# Patient Record
Sex: MALE | Race: WHITE | Employment: OTHER | URBAN - METROPOLITAN AREA
[De-identification: names, ages, dates, MRNs, and addresses within clinical notes are randomized per-mention and may not be internally consistent; named-entity substitution may affect disease eponyms.]

---

## 2020-10-02 ENCOUNTER — APPOINTMENT (OUTPATIENT)
Dept: CT IMAGING | Age: 70
End: 2020-10-02
Attending: EMERGENCY MEDICINE
Payer: MEDICARE

## 2020-10-02 ENCOUNTER — APPOINTMENT (OUTPATIENT)
Dept: GENERAL RADIOLOGY | Age: 70
End: 2020-10-02
Attending: EMERGENCY MEDICINE
Payer: MEDICARE

## 2020-10-02 ENCOUNTER — HOSPITAL ENCOUNTER (OUTPATIENT)
Age: 70
Setting detail: OBSERVATION
Discharge: HOME | End: 2020-10-03
Attending: EMERGENCY MEDICINE | Admitting: INTERNAL MEDICINE
Payer: MEDICARE

## 2020-10-02 DIAGNOSIS — R55 SYNCOPE AND COLLAPSE: Primary | ICD-10-CM

## 2020-10-02 LAB
ALBUMIN SERPL-MCNC: 3.9 G/DL (ref 3.5–5)
ALBUMIN/GLOB SERPL: 1.1 {RATIO} (ref 1.1–2.2)
ALP SERPL-CCNC: 68 U/L (ref 45–117)
ALT SERPL-CCNC: 18 U/L (ref 12–78)
ANION GAP SERPL CALC-SCNC: 5 MMOL/L (ref 5–15)
APPEARANCE UR: CLEAR
AST SERPL-CCNC: 13 U/L (ref 15–37)
BACTERIA URNS QL MICRO: NEGATIVE /HPF
BASOPHILS # BLD: 0.1 K/UL (ref 0–0.1)
BASOPHILS NFR BLD: 1 % (ref 0–1)
BILIRUB SERPL-MCNC: 0.2 MG/DL (ref 0.2–1)
BILIRUB UR QL: NEGATIVE
BNP SERPL-MCNC: 141 PG/ML
BUN SERPL-MCNC: 7 MG/DL (ref 6–20)
BUN/CREAT SERPL: 7 (ref 12–20)
CALCIUM SERPL-MCNC: 8.8 MG/DL (ref 8.5–10.1)
CHLORIDE SERPL-SCNC: 107 MMOL/L (ref 97–108)
CO2 SERPL-SCNC: 28 MMOL/L (ref 21–32)
COLOR UR: NORMAL
COMMENT, HOLDF: NORMAL
CREAT SERPL-MCNC: 1.05 MG/DL (ref 0.7–1.3)
DIFFERENTIAL METHOD BLD: NORMAL
EOSINOPHIL # BLD: 0.2 K/UL (ref 0–0.4)
EOSINOPHIL NFR BLD: 2 % (ref 0–7)
EPITH CASTS URNS QL MICRO: NORMAL /LPF
ERYTHROCYTE [DISTWIDTH] IN BLOOD BY AUTOMATED COUNT: 13 % (ref 11.5–14.5)
GLOBULIN SER CALC-MCNC: 3.4 G/DL (ref 2–4)
GLUCOSE SERPL-MCNC: 73 MG/DL (ref 65–100)
GLUCOSE UR STRIP.AUTO-MCNC: NEGATIVE MG/DL
HCT VFR BLD AUTO: 45.1 % (ref 36.6–50.3)
HGB BLD-MCNC: 15.1 G/DL (ref 12.1–17)
HGB UR QL STRIP: NEGATIVE
HYALINE CASTS URNS QL MICRO: NORMAL /LPF (ref 0–5)
IMM GRANULOCYTES # BLD AUTO: 0 K/UL (ref 0–0.04)
IMM GRANULOCYTES NFR BLD AUTO: 0 % (ref 0–0.5)
KETONES UR QL STRIP.AUTO: NEGATIVE MG/DL
LEUKOCYTE ESTERASE UR QL STRIP.AUTO: NEGATIVE
LYMPHOCYTES # BLD: 3 K/UL (ref 0.8–3.5)
LYMPHOCYTES NFR BLD: 31 % (ref 12–49)
MCH RBC QN AUTO: 31.6 PG (ref 26–34)
MCHC RBC AUTO-ENTMCNC: 33.5 G/DL (ref 30–36.5)
MCV RBC AUTO: 94.4 FL (ref 80–99)
MONOCYTES # BLD: 0.6 K/UL (ref 0–1)
MONOCYTES NFR BLD: 6 % (ref 5–13)
NEUTS SEG # BLD: 5.9 K/UL (ref 1.8–8)
NEUTS SEG NFR BLD: 60 % (ref 32–75)
NITRITE UR QL STRIP.AUTO: NEGATIVE
NRBC # BLD: 0 K/UL (ref 0–0.01)
NRBC BLD-RTO: 0 PER 100 WBC
PH UR STRIP: 6 [PH] (ref 5–8)
PLATELET # BLD AUTO: 259 K/UL (ref 150–400)
PMV BLD AUTO: 8.9 FL (ref 8.9–12.9)
POTASSIUM SERPL-SCNC: 3.5 MMOL/L (ref 3.5–5.1)
PROT SERPL-MCNC: 7.3 G/DL (ref 6.4–8.2)
PROT UR STRIP-MCNC: NEGATIVE MG/DL
RBC # BLD AUTO: 4.78 M/UL (ref 4.1–5.7)
RBC #/AREA URNS HPF: NORMAL /HPF (ref 0–5)
SAMPLES BEING HELD,HOLD: NORMAL
SODIUM SERPL-SCNC: 140 MMOL/L (ref 136–145)
SP GR UR REFRACTOMETRY: 1.01 (ref 1–1.03)
TROPONIN I SERPL-MCNC: <0.05 NG/ML
UROBILINOGEN UR QL STRIP.AUTO: 0.2 EU/DL (ref 0.2–1)
WBC # BLD AUTO: 9.8 K/UL (ref 4.1–11.1)
WBC URNS QL MICRO: NORMAL /HPF (ref 0–4)

## 2020-10-02 PROCEDURE — 99218 HC RM OBSERVATION: CPT

## 2020-10-02 PROCEDURE — 36415 COLL VENOUS BLD VENIPUNCTURE: CPT

## 2020-10-02 PROCEDURE — 70450 CT HEAD/BRAIN W/O DYE: CPT

## 2020-10-02 PROCEDURE — 83880 ASSAY OF NATRIURETIC PEPTIDE: CPT

## 2020-10-02 PROCEDURE — 85025 COMPLETE CBC W/AUTO DIFF WBC: CPT

## 2020-10-02 PROCEDURE — 84484 ASSAY OF TROPONIN QUANT: CPT

## 2020-10-02 PROCEDURE — 81001 URINALYSIS AUTO W/SCOPE: CPT

## 2020-10-02 PROCEDURE — 71045 X-RAY EXAM CHEST 1 VIEW: CPT

## 2020-10-02 PROCEDURE — 99285 EMERGENCY DEPT VISIT HI MDM: CPT

## 2020-10-02 PROCEDURE — 93005 ELECTROCARDIOGRAM TRACING: CPT

## 2020-10-02 PROCEDURE — 80053 COMPREHEN METABOLIC PANEL: CPT

## 2020-10-02 PROCEDURE — 72125 CT NECK SPINE W/O DYE: CPT

## 2020-10-02 RX ORDER — SODIUM CHLORIDE 9 MG/ML
100 INJECTION, SOLUTION INTRAVENOUS CONTINUOUS
Status: DISCONTINUED | OUTPATIENT
Start: 2020-10-03 | End: 2020-10-03 | Stop reason: HOSPADM

## 2020-10-02 RX ORDER — ACETAMINOPHEN 650 MG/1
650 SUPPOSITORY RECTAL
Status: DISCONTINUED | OUTPATIENT
Start: 2020-10-02 | End: 2020-10-03 | Stop reason: HOSPADM

## 2020-10-02 RX ORDER — POLYETHYLENE GLYCOL 3350 17 G/17G
17 POWDER, FOR SOLUTION ORAL DAILY PRN
Status: DISCONTINUED | OUTPATIENT
Start: 2020-10-02 | End: 2020-10-03 | Stop reason: HOSPADM

## 2020-10-02 RX ORDER — ONDANSETRON 2 MG/ML
4 INJECTION INTRAMUSCULAR; INTRAVENOUS
Status: DISCONTINUED | OUTPATIENT
Start: 2020-10-02 | End: 2020-10-03 | Stop reason: HOSPADM

## 2020-10-02 RX ORDER — PROMETHAZINE HYDROCHLORIDE 25 MG/1
12.5 TABLET ORAL
Status: DISCONTINUED | OUTPATIENT
Start: 2020-10-02 | End: 2020-10-03 | Stop reason: HOSPADM

## 2020-10-02 RX ORDER — SODIUM CHLORIDE 0.9 % (FLUSH) 0.9 %
5-40 SYRINGE (ML) INJECTION AS NEEDED
Status: DISCONTINUED | OUTPATIENT
Start: 2020-10-02 | End: 2020-10-03 | Stop reason: HOSPADM

## 2020-10-02 RX ORDER — IBUPROFEN 200 MG
1 TABLET ORAL DAILY
Status: DISCONTINUED | OUTPATIENT
Start: 2020-10-02 | End: 2020-10-03 | Stop reason: HOSPADM

## 2020-10-02 RX ORDER — IBUPROFEN 200 MG
1 TABLET ORAL DAILY
Status: DISCONTINUED | OUTPATIENT
Start: 2020-10-03 | End: 2020-10-02

## 2020-10-02 RX ORDER — ENOXAPARIN SODIUM 100 MG/ML
40 INJECTION SUBCUTANEOUS DAILY
Status: DISCONTINUED | OUTPATIENT
Start: 2020-10-03 | End: 2020-10-03 | Stop reason: HOSPADM

## 2020-10-02 RX ORDER — ACETAMINOPHEN 325 MG/1
650 TABLET ORAL
Status: DISCONTINUED | OUTPATIENT
Start: 2020-10-02 | End: 2020-10-03 | Stop reason: HOSPADM

## 2020-10-02 RX ORDER — SODIUM CHLORIDE 0.9 % (FLUSH) 0.9 %
5-40 SYRINGE (ML) INJECTION EVERY 8 HOURS
Status: DISCONTINUED | OUTPATIENT
Start: 2020-10-03 | End: 2020-10-03 | Stop reason: HOSPADM

## 2020-10-03 ENCOUNTER — APPOINTMENT (OUTPATIENT)
Dept: NON INVASIVE DIAGNOSTICS | Age: 70
End: 2020-10-03
Attending: INTERNAL MEDICINE
Payer: MEDICARE

## 2020-10-03 VITALS
SYSTOLIC BLOOD PRESSURE: 140 MMHG | OXYGEN SATURATION: 97 % | BODY MASS INDEX: 22.5 KG/M2 | DIASTOLIC BLOOD PRESSURE: 79 MMHG | TEMPERATURE: 98.8 F | HEIGHT: 66 IN | HEART RATE: 100 BPM | RESPIRATION RATE: 16 BRPM | WEIGHT: 140 LBS

## 2020-10-03 LAB
ALBUMIN SERPL-MCNC: 3.9 G/DL (ref 3.5–5)
ALBUMIN/GLOB SERPL: 1.1 {RATIO} (ref 1.1–2.2)
ALP SERPL-CCNC: 66 U/L (ref 45–117)
ALT SERPL-CCNC: 18 U/L (ref 12–78)
ANION GAP SERPL CALC-SCNC: 4 MMOL/L (ref 5–15)
AST SERPL-CCNC: 14 U/L (ref 15–37)
BILIRUB SERPL-MCNC: 0.4 MG/DL (ref 0.2–1)
BUN SERPL-MCNC: 7 MG/DL (ref 6–20)
BUN/CREAT SERPL: 8 (ref 12–20)
CALCIUM SERPL-MCNC: 8.6 MG/DL (ref 8.5–10.1)
CHLORIDE SERPL-SCNC: 108 MMOL/L (ref 97–108)
CO2 SERPL-SCNC: 28 MMOL/L (ref 21–32)
CREAT SERPL-MCNC: 0.91 MG/DL (ref 0.7–1.3)
D DIMER PPP FEU-MCNC: 0.83 MG/L FEU (ref 0–0.65)
ECHO AO ROOT DIAM: 3.46 CM
ECHO AV AREA PEAK VELOCITY: 2.02 CM2
ECHO AV AREA PEAK VELOCITY: 2.02 CM2
ECHO AV PEAK GRADIENT: 4.03 MMHG
ECHO AV PEAK VELOCITY: 100.39 CM/S
ECHO LA AREA 4C: 9.09 CM2
ECHO LA MAJOR AXIS: 2.47 CM
ECHO LA MINOR AXIS: 1.44 CM
ECHO LA VOL 2C: 17.8 ML (ref 18–58)
ECHO LA VOL 4C: 15.89 ML (ref 18–58)
ECHO LA VOL BP: 21.01 ML (ref 18–58)
ECHO LA VOL/BSA BIPLANE: 12.22 ML/M2 (ref 16–28)
ECHO LA VOLUME INDEX A2C: 10.36 ML/M2 (ref 16–28)
ECHO LA VOLUME INDEX A4C: 9.24 ML/M2 (ref 16–28)
ECHO LV E' LATERAL VELOCITY: 10.66 CENTIMETER/SECOND
ECHO LV E' SEPTAL VELOCITY: 8.96 CENTIMETER/SECOND
ECHO LV INTERNAL DIMENSION DIASTOLIC: 3.33 CM (ref 4.2–5.9)
ECHO LV INTERNAL DIMENSION SYSTOLIC: 2.34 CM
ECHO LV IVSD: 0.76 CM (ref 0.6–1)
ECHO LV MASS 2D: 69.6 G (ref 88–224)
ECHO LV MASS INDEX 2D: 40.5 G/M2 (ref 49–115)
ECHO LV POSTERIOR WALL DIASTOLIC: 0.84 CM (ref 0.6–1)
ECHO LVOT DIAM: 1.84 CM
ECHO LVOT PEAK GRADIENT: 2.35 MMHG
ECHO LVOT PEAK VELOCITY: 76.6 CM/S
ECHO MV A VELOCITY: 90.93 CENTIMETER/SECOND
ECHO MV AREA PHT: 4.08 CM2
ECHO MV E DECELERATION TIME (DT): 0.19 S
ECHO MV E VELOCITY: 71.5 CENTIMETER/SECOND
ECHO MV PRESSURE HALF TIME (PHT): 0.05 S
ECHO PV PEAK INSTANTANEOUS GRADIENT SYSTOLIC: 3.26 MMHG
ECHO PV REGURGITANT MAX VELOCITY: 90.31 CM/S
ECHO RV INTERNAL DIMENSION: 2.41 CM
ECHO RV TAPSE: 1.56 CM (ref 1.5–2)
ECHO TV REGURGITANT MAX VELOCITY: 163.05 CM/S
ECHO TV REGURGITANT MAX VELOCITY: 206.2 CM/S
ECHO TV REGURGITANT MAX VELOCITY: 234.41 CM/S
ECHO TV REGURGITANT MAX VELOCITY: 234.41 CM/S
ECHO TV REGURGITANT PEAK GRADIENT: 10.63 MMHG
ECHO TV REGURGITANT PEAK GRADIENT: 17.01 MMHG
ECHO TV REGURGITANT PEAK GRADIENT: 21.98 MMHG
ECHO TV REGURGITANT PEAK GRADIENT: 21.98 MMHG
ERYTHROCYTE [DISTWIDTH] IN BLOOD BY AUTOMATED COUNT: 12.9 % (ref 11.5–14.5)
GLOBULIN SER CALC-MCNC: 3.4 G/DL (ref 2–4)
GLUCOSE SERPL-MCNC: 93 MG/DL (ref 65–100)
HCT VFR BLD AUTO: 43.4 % (ref 36.6–50.3)
HGB BLD-MCNC: 14.6 G/DL (ref 12.1–17)
LA VOL DISK BP: 18.19 ML (ref 18–58)
MAGNESIUM SERPL-MCNC: 2.3 MG/DL (ref 1.6–2.4)
MCH RBC QN AUTO: 31.4 PG (ref 26–34)
MCHC RBC AUTO-ENTMCNC: 33.6 G/DL (ref 30–36.5)
MCV RBC AUTO: 93.3 FL (ref 80–99)
NRBC # BLD: 0 K/UL (ref 0–0.01)
NRBC BLD-RTO: 0 PER 100 WBC
PLATELET # BLD AUTO: 255 K/UL (ref 150–400)
PMV BLD AUTO: 8.8 FL (ref 8.9–12.9)
POTASSIUM SERPL-SCNC: 3.8 MMOL/L (ref 3.5–5.1)
PROT SERPL-MCNC: 7.3 G/DL (ref 6.4–8.2)
RBC # BLD AUTO: 4.65 M/UL (ref 4.1–5.7)
SODIUM SERPL-SCNC: 140 MMOL/L (ref 136–145)
WBC # BLD AUTO: 9.7 K/UL (ref 4.1–11.1)

## 2020-10-03 PROCEDURE — C8929 TTE W OR WO FOL WCON,DOPPLER: HCPCS

## 2020-10-03 PROCEDURE — 85379 FIBRIN DEGRADATION QUANT: CPT

## 2020-10-03 PROCEDURE — 74011250636 HC RX REV CODE- 250/636: Performed by: INTERNAL MEDICINE

## 2020-10-03 PROCEDURE — 99204 OFFICE O/P NEW MOD 45 MIN: CPT | Performed by: SPECIALIST

## 2020-10-03 PROCEDURE — 80053 COMPREHEN METABOLIC PANEL: CPT

## 2020-10-03 PROCEDURE — 96372 THER/PROPH/DIAG INJ SC/IM: CPT

## 2020-10-03 PROCEDURE — 83735 ASSAY OF MAGNESIUM: CPT

## 2020-10-03 PROCEDURE — 74011250637 HC RX REV CODE- 250/637: Performed by: INTERNAL MEDICINE

## 2020-10-03 PROCEDURE — 36415 COLL VENOUS BLD VENIPUNCTURE: CPT

## 2020-10-03 PROCEDURE — 74011250636 HC RX REV CODE- 250/636: Performed by: FAMILY MEDICINE

## 2020-10-03 PROCEDURE — 99205 OFFICE O/P NEW HI 60 MIN: CPT | Performed by: PSYCHIATRY & NEUROLOGY

## 2020-10-03 PROCEDURE — 85027 COMPLETE CBC AUTOMATED: CPT

## 2020-10-03 PROCEDURE — 99218 HC RM OBSERVATION: CPT

## 2020-10-03 RX ADMIN — ENOXAPARIN SODIUM 40 MG: 40 INJECTION SUBCUTANEOUS at 08:56

## 2020-10-03 RX ADMIN — SODIUM CHLORIDE 100 ML/HR: 900 INJECTION, SOLUTION INTRAVENOUS at 00:07

## 2020-10-03 RX ADMIN — Medication 10 ML: at 06:00

## 2020-10-03 RX ADMIN — Medication 20 ML: at 14:00

## 2020-10-03 RX ADMIN — PERFLUTREN 2 ML: 6.52 INJECTION, SUSPENSION INTRAVENOUS at 12:17

## 2020-10-03 RX ADMIN — Medication 10 ML: at 00:08

## 2020-10-03 NOTE — PROGRESS NOTES
[facility]: MED RECONCILIATION    Comments/Recommendations:     Pt's daughter states Pt does not take any medications    Information obtained from: Daughter    Allergies: Pcn [penicillins]    Prior to Admission Medications:     None           Geovanni Martin Pharm. D.    Clinical Staff Pharmacist  [address] [place] [address]  [phone]

## 2020-10-03 NOTE — H&P
[facility]  HISTORY AND PHYSICAL    Name:  Christine Page  MR#:  [id number]  :  1950  ACCOUNT #:  [de-identified]    DATE OF SERVICE:  10/02/2020    PRIMARY CARE PHYSICIAN:  Shantelle Cordova MD    CHIEF COMPLAINT:  Syncope and collapse. HISTORY OF PRESENT ILLNESS:  Please note patient's daughter at the bedside contributed to most of the history. 57-year-old gentleman without any significant past medical history except for ongoing tobacco use disorder, was brought to the emergency room from home for an unwitnessed syncopal episode. Per  patient's daughter, patient's son-in-law thought he heard a \"thump\" and thereafter found patient lying on the floor in the evening hours of 10/02/20 . Patient had no recollection of the events leading up to the fall. Patient was at his baseline health without any prior complaints of headaches, dizziness, visual deficits, chest pain, palpitations, sore throat, cough, nausea, vomiting, fevers, chills, abdominal pain, bladder or bowel irregularities prior to being found on the floor. PAST MEDICAL HISTORY:  1.  Presbycusis. 2.  Ongoing tobacco use disorder. PAST SURGICAL HISTORY:  Nil of note. HOME MEDICATIONS:  None. ALLERGIES:  PENICILLIN. SOCIAL HISTORY:  Significant for living at home. Is independent of activities and instrumental activities of daily living. Has been a lifelong smoker. Prior alcohol use disorder. No recent travels or known sick contacts. FAMILY HISTORY:  Reviewed. Negative for any premature coronary artery disease or death. Negative for diabetes mellitus. REVIEW OF SYSTEMS:  A complete system review conducted essentially negative, otherwise as outlined in the history of the presenting illness. PHYSICAL EXAMINATION:  GENERAL:  No acute distress. VITAL SIGNS:  Blood pressure 146/75, heart rate 69, respiratory rate 17, afebrile, saturating 98% on room air. HEAD:  Normocephalic.   Pupils equal and reactive to light without any nystagmus. ENT:  Ear, nose, throat clear. NECK:  No jugular venous distention or carotid bruits. CARDIOVASCULAR:  S1, S2 present without murmurs. RESPIRATORY:  Lungs with decreased air entry at both bases without any crepitations or rhonchi. GI:  Bowel sounds present. The abdomen is soft, nontender. No rebound, no guarding. GENITOURINARY:  No suprapubic or flank tenderness. MUSCULOSKELETAL:  No asymmetry of the extremities. CNS:  Awake, alert; oriented to place and person. LABORATORY DATA/RADIOGRAPHIC FINDINGS:      CT head without contrast negative for any acute intracranial findings. CT cervical spine with multilevel spondylosis. No acute abnormality. 12-lead EKG personally reviewed with normal sinus rhythm at 86 per minute. Metabolic panel with sodium of 140, potassium 3.5, chloride 107, bicarb 28, BUN of 7, creatinine 1.05, glucose 73, calcium 8.8 with an albumin of 3.9. Total bilirubin 0.2, ALT of 18, AST of 13, alkaline phosphatase 68. First troponin less than 0.05, a proBNP of 141. CBC with a WBC of 9.8, hemoglobin 15.1, hematocrit 45.1, platelet count 385, MCV 94.4. Urinalysis with yellow straw clear urine, negative nitrites, negative leukocyte esterase, wbc's 0-4. ASSESSMENT/PLAN:  1. Cardiovascular:  Unwitnessed syncope and collapse. Would need to monitor for dysrhythmias. Probable hypertension. IV fluids, orthostatic readings, 2-D echocardiogram, Cardiology consult. 2.  Ear, nose, throat:  Severe presbycusis with non-compliance with hearing aids. 3.  Social:  Long history of ongoing tobacco use disorder. Nicotine replacement therapy. Cessation counseling done. 4.  Prophylaxis for deep venous thrombosis. 5.  Directives:  Full code with a guarded prognosis. Discussed with  patient.     In summary, 55-year-old gentleman without any significant past medical history except ongoing tobacco use disorder and presbycusis, is being admitted to the observation level for unwitnessed syncope and collapse, necessitating ongoing close monitoring and management including observing for dysrhythmias, IV hydration, orthostatic readings, 2-D echocardiogram, Cardiology consult. The entire admission plan discussed in detail with  patient and patient's daughter, Diamond Poe, who was at the bedside and can be reached at cell [phone]. All questions and concerns were addressed. Patient's functional status prior to admission significant for patient being able to ambulate unassisted. Total time for admission 40 minutes, of which at least 50% of the time was spent in plan of care and counseling of  patient.       MD AKI Nicholson/[id number]  D:  10/02/2020 22:57  T:  10/02/2020 23:35  JOB #:  [id number]

## 2020-10-03 NOTE — DISCHARGE INSTRUCTIONS
Patient Discharge Instructions    Aquiles Dougherty / [id number] : 1950    Admitted 10/2/2020 Discharged: 10/3/2020 11:35 AM     ACUTE DIAGNOSES:  Syncope and collapse [R55]    CHRONIC MEDICAL DIAGNOSES:  Problem List as of 10/3/2020 Never Reviewed          Codes Class Noted - Resolved    Syncope and collapse ICD-10-CM: R55  ICD-9-CM: 780.2  10/2/2020 - Present              DISCHARGE MEDICATIONS:         · It is important that you take the medication exactly as they are prescribed. · Keep your medication in the bottles provided by the pharmacist and keep a list of the medication names, dosages, and times to be taken in your wallet. · Do not take other medications without consulting your doctor. DIET:  Regular Diet    ACTIVITY: Activity as tolerated    ADDITIONAL INFORMATION: If you experience any of the following symptoms then please call your primary care physician or return to the emergency room if you cannot get hold of your doctor: Fever, chills, nausea, vomiting, diarrhea, change in mentation, falling, bleeding, shortness of breath. FOLLOW UP CARE:  Dr. Catie Ann MD  you are to call and set up an appointment to see them with in 1 week. Follow-up with specialists at directed by them      Information obtained by :  I understand that if any problems occur once I am at home I am to contact my physician. I understand and acknowledge receipt of the instructions indicated above.                                                                                                                                            Physician's or R.N.'s Signature                                                                  Date/Time                                                                                                                                              Patient or Representative Signature                                                          Date/Time

## 2020-10-03 NOTE — CONSULTS
Cheryl Small MD [facility] - [address] [phone]    Date of  Admission: 10/2/2020  8:13 PM  PCP- Queen Lucila MD    Candida Santana is a 68 y.o. male admitted for Syncope and collapse [R55]. Consult requested by Emir Eastman MD    Assessment  · Syncope/fall, unwitnessed  · Nicotine dependence with likely COPD  · Dementia    Discussion/Recommendations:  Post syncopal confusion raises some suspicion of possible seizure. Exam and EKG normal. Enjoys good physical health and fairly active. No orthostasis noted    Cardiac wise, differential dx includes bradycardia. Doubt structural heart disease. Echo pending. Neuro evaluation pending    If echo normal, I would discharge. I am happy to see him back in a month or so. We discussed rhythm monitoring if he has recurrence    Subjective:  Admitted following unwitnessed syncopal episode at home yesterday. Daughter noted he was confused for some time but no seizure activity nor B/B incontinence demonstrated. Brought to ER via EMS. Head CT, CXR, EKG and labs all normal. Tele overnight unremarkable. He feels well this morning and is anxious to go home. Hx mostly provided by his daughter. Patient has some dementia but is very active - mows with push mower. Long term smoker with cough but no keila CARDENAS. No prior cardiac hx. No hx of HTN or DM  Does not take any Rx meds      No past medical history on file. No past surgical history on file. No current facility-administered medications on file prior to encounter. No current outpatient medications on file prior to encounter. Allergies   Allergen Reactions    Pcn [Penicillins] Unknown (comments)     \"I don't know what happens, I just know I'm allergic. \"             Review of Symptoms:  Constitutional: negative for fevers, chills, anorexia and weight loss  Respiratory: negative for hemoptysis or pleurisy/chest pain  Gastrointestinal: negative for dysphagia, odynophagia, dyspepsia, melena and abdominal pain  Musculoskeletal:negative  Neurological: negative for dizziness and vertigo  Other systems reviewed and negative except as above. Physical Exam    Visit Vitals  BP (!) 140/79 (BP 1 Location: Right arm, BP Patient Position: Sitting)   Pulse 100   Temp 98.8 °F (37.1 °C)   Resp 16   Ht 5' 6\" (1.676 m)   Wt 140 lb (63.5 kg)   SpO2 97%   BMI 22.60 kg/m²     Visit Vitals  BP (!) 140/79 (BP 1 Location: Right arm, BP Patient Position: Sitting)   Pulse 100   Temp 98.8 °F (37.1 °C)   Resp 16   Ht 5' 6\" (1.676 m)   Wt 140 lb (63.5 kg)   SpO2 97%   BMI 22.60 kg/m²     General Appearance:  Well developed, well nourished,alert and oriented x 3, and individual in no acute distress. Ears/Nose/Mouth/Throat:   Hearing grossly normal.         Neck: Supple. Chest:   Lungs clear to auscultation bilaterally. Cardiovascular:  Regular rate and rhythm, S1, S2 normal, no murmur. Abdomen:   Soft, non-tender, bowel sounds are active. Extremities: No edema bilaterally. Skin: Warm and dry.                Cardiographics    Telemetry: normal sinus rhythm  ECG: normal EKG, normal sinus rhythm    Echocardiogram: Not done    Recent Results (from the past 12 hour(s))   D DIMER    Collection Time: 10/03/20 12:12 AM   Result Value Ref Range    D-dimer 0.83 (H) 0.00 - 0.65 mg/L U   METABOLIC PANEL, COMPREHENSIVE    Collection Time: 10/03/20 12:12 AM   Result Value Ref Range    Sodium 140 136 - 145 mmol/L    Potassium 3.8 3.5 - 5.1 mmol/L    Chloride 108 97 - 108 mmol/L    CO2 28 21 - 32 mmol/L    Anion gap 4 (L) 5 - 15 mmol/L    Glucose 93 65 - 100 mg/dL    BUN 7 6 - 20 MG/DL    Creatinine 0.91 0.70 - 1.30 MG/DL    BUN/Creatinine ratio 8 (L) 12 - 20      GFR est AA >60 >60 ml/min/1.73m2    GFR est non-AA >60 >60 ml/min/1.73m2    Calcium 8.6 8.5 - 10.1 MG/DL    Bilirubin, total 0.4 0.2 - 1.0 MG/DL    ALT (SGPT) 18 12 - 78 U/L    AST (SGOT) 14 (L) 15 - 37 U/L    Alk.  phosphatase 66 45 - 117 U/L    Protein, total 7.3 6.4 - 8.2 g/dL    Albumin 3.9 3.5 - 5.0 g/dL    Globulin 3.4 2.0 - 4.0 g/dL    A-G Ratio 1.1 1.1 - 2.2     MAGNESIUM    Collection Time: 10/03/20 12:12 AM   Result Value Ref Range    Magnesium 2.3 1.6 - 2.4 mg/dL   CBC W/O DIFF    Collection Time: 10/03/20 12:12 AM   Result Value Ref Range    WBC 9.7 4.1 - 11.1 K/uL    RBC 4.65 4.10 - 5.70 M/uL    HGB 14.6 12.1 - 17.0 g/dL    HCT 43.4 36.6 - 50.3 %    MCV 93.3 80.0 - 99.0 FL    MCH 31.4 26.0 - 34.0 PG    MCHC 33.6 30.0 - 36.5 g/dL    RDW 12.9 11.5 - 14.5 %    PLATELET 604 402 - 713 K/uL    MPV 8.8 (L) 8.9 - 12.9 FL    NRBC 0.0 0  WBC    ABSOLUTE NRBC 0.00 0.00 - 0.01 K/uL

## 2020-10-03 NOTE — PROGRESS NOTES
Bedside and Verbal shift change report given to Melvin Cintron RN (oncoming nurse) by [place]). Report included the following information SBAR, Kardex, Intake/Output, MAR, Accordion and Recent Results.

## 2020-10-03 NOTE — DISCHARGE SUMMARY
Physician Discharge Summary     Patient ID:    Iveth Akbar  [id number]  68 y.o.  1950  Antoinette Sullivan MD    Admit date: 10/2/2020  Discharge date and time: 10/3/2020  Admission Diagnoses: Syncope and collapse [R55]    Chronic Diagnoses:    Problem List as of 10/3/2020 Never Reviewed          Codes Class Noted - Resolved    Syncope and collapse ICD-10-CM: R55  ICD-9-CM: 780.2  10/2/2020 - Present            Discharge Medications: There are no discharge medications for this patient. Follow up Care:    1. Antoinette Sullivan MD with in 1 weeks  2.  specialists as directed. Diet:  Regular Diet  Disposition:  Home. Advanced Directive:  Discharge Exam:  [See today's progress note.]  CONSULTATIONS: Cardiology and Neurology    Significant Diagnostic Studies:   Recent Labs     10/03/20  0012 10/02/20  2029   WBC 9.7 9.8   HGB 14.6 15.1   HCT 43.4 45.1    259     Recent Labs     10/03/20  0012 10/02/20  2029    140   K 3.8 3.5    107   CO2 28 28   BUN 7 7   CREA 0.91 1.05   GLU 93 73   CA 8.6 8.8   MG 2.3  --      Recent Labs     10/03/20  0012 10/02/20  2029   ALT 18 18   AP 66 68   TBILI 0.4 0.2   TP 7.3 7.3   ALB 3.9 3.9   GLOB 3.4 3.4     HOSPITAL COURSE & TREATMENT RENDERED:   Pt was adamant about going home and refused ECHO and other workup inpt. Cardiology and Neurology saw him and agreed to outpt work up so pt was discharged with follow up Cardiology and Neurology appts to be set up by them. Also to follow up with PCP within one wk.     Also see today's progress note:    Daily Progress Note: 10/3/2020  Kelle Barrett MD      Assessment/Plan:    Unwitnessed syncope:  Syncope w/u and Neuro and Cards consulted      Probable hypertension.   - monitor and meds if needed     Chronic hearing loss:  Severe presbycusis with non-compliance with hearing aids.     Tobacco abuse:Long history of ongoing tobacco use disorder.  Nicotine replacement therapy.  Cessation counseling done.     Dementia  - at least minimal cognitive         Problem List:             Problem List as of 10/3/2020 Never Reviewed           Codes Class Noted - Resolved     Syncope and collapse ICD-10-CM: R55  ICD-9-CM: 842. 2   10/2/2020 - Present      Subjective:     Please note patient's daughter at the bedside contributed to most of the history.   68year-old gentleman without any significant past medical history except for ongoing tobacco use disorder, was brought to the emergency room from home for an unwitnessed syncopal episode. Per  patient's daughter, patient's son-in-law thought he heard a \"thump\" and thereafter found patient lying on the floor in the evening hours of 10/02/20 .  Patient had no recollection of the events leading up to the fall. Patient was at his baseline health without any prior complaints of headaches, dizziness, visual deficits, chest pain, palpitations, sore throat, cough, nausea, vomiting, fevers, chills, abdominal pain, bladder or bowel irregularities prior to being found on the floor. (Dr Shahriar Olson)     10/3:  No complaints. He would not stay in room per nurses and has been up in [place] and sitting with nurses. He does not know why he is here.       Review of Systems:   A comprehensive review of systems was negative except for that written in the HPI.     Objective:   Physical Exam:   Visit Vitals  /77 (BP 1 Location: Right arm, BP Patient Position: Standing)   Pulse 90   Temp 98.5 °F (36.9 °C)   Resp 18   Ht 5' 6\" (1.676 m)   Wt 63.5 kg (140 lb)   SpO2 96%   BMI 22.60 kg/m²      O2 Device: Room air     Temp (24hrs), Av.4 °F (36.9 °C), Min:98.1 °F (36.7 °C), Max:98.7 °F (37.1 °C)    No intake/output data recorded. 10/01 1901 - 10/03 0700  In: 588.3 [I.V.:588.3]  Out: -   General:  Alert, thin, cooperative, no distress, appears stated age. Head:  Normocephalic, without obvious abnormality, atraumatic. Eyes:  Conjunctivae/corneas clear. PERRL, EOMs intact.    Throat: Lips, mucosa, and tongue moist..   Neck: Supple, symmetrical, trachea midline, no adenopathy, thyroid: no enlargement/tenderness/nodules, no carotid bruit and no JVD. Lungs:   Clear to auscultation bilaterally. Chest wall:  No tenderness or deformity. Heart:  Regular rate and rhythm, S1, S2 normal, no murmur, click, rub or gallop. Abdomen:   Soft, non-tender. Bowel sounds normal. No masses,  No organomegaly. Extremities: no cyanosis or edema. No calf tenderness or cords. Pulses: 2+ and symmetric all extremities. Skin: Skin color, texture, turgor normal. No rashes   Neurologic: Severe hearing loss. Alert and oriented X 2. Paucity of content of speech. Vague answers to questions.  equal.  No cogwheeling or rigidity. Gait observed as he walked in [place] was acceptable. Sensation grossly normal to touch. Gross motor of extremities normal.        Data Review:     EXAM: CT HEAD WO CONT 10/2/20  INDICATION: dizziness/ fell and hit head  COMPARISON: None. CONTRAST: None. FINDINGS:  The ventricles and sulci are normal in size, shape and configuration. . There is  mild white matter disease likely related to chronic small vessel ischemic  disease. There is no intracranial hemorrhage, extra-axial collection, or mass  effect. The basilar cisterns are open. No CT evidence of acute infarct. The bone windows demonstrate no abnormalities. The visualized portions of the  paranasal sinuses and mastoid air cells are clear. IMPRESSION:   No evidence of acute process.     CT Cervical Spine 10/2/20  FINDINGS:  There is mild reversal of curvature centered at C4-5. There is minimal  anterolisthesis of C7 on T1. There is no fracture or compression deformity. The  odontoid process is intact. The craniocervical junction is within normal limits. The incidentally imaged soft tissues are within normal limits. C2-C3: Mild central disc osteophyte complex with thickening of ligamentum flavum  causing mild spinal stenosis.  No significant neural foraminal narrowing. [place] C3-C4: Mild broad-based disc osteophyte complex causing moderate spinal  stenosis. There is moderate left neural foraminal narrowing. C4-C5: Mild disc space narrowing, worse anteriorly. No significant spinal  stenosis. There is moderate left and mild right neural foraminal narrowing. C5-C6: No significant spinal stenosis. There is mild left neural foraminal  narrowing. Ryan [place] C6-C7: No significant spinal stenosis. Mild bilateral neural foraminal  narrowing. .  C7-T1: No significant spinal stenosis. There is moderate left neural foraminal  narrowing. Ryan Victoria IMPRESSION:  Multilevel spondylosis. No acute abnormality.     Signed:  Carlita Lopez MD  10/3/2020  11:35 AM

## 2020-10-03 NOTE — CONSULTS
NEUROLOGY IN-PATIENT NEW CONSULTATION      10/3/2020    RE: Zohra Dunham         1950      REFERRED BY:  Francesco Wetzel MD      CHIEF COMPLAINT:  This is Zohra Dunham is a 68 y.o. male  who had concerns including Fall. HPI:     Patient apparently passed out and was found on the floor. (-) tongue bite  (-) incontinence  (-) witnessed shaking  (+) confusion for 5 hrs    ROS   (-) fever  (-) rash  All other systems reviewed and are negative    Past Medical Hx  No past medical history on file. Social Hx  Social History     Socioeconomic History    Marital status:      Spouse name: Not on file    Number of children: Not on file    Years of education: Not on file    Highest education level: Not on file       Family Hx  No family history on file. ALLERGIES  Allergies   Allergen Reactions    Pcn [Penicillins] Unknown (comments)     \"I don't know what happens, I just know I'm allergic. \"       CURRENT MEDS  Current Facility-Administered Medications   Medication Dose Route Frequency Provider Last Rate Last Dose    sodium chloride (NS) flush 5-40 mL  5-40 mL IntraVENous Q8H Nardadru BLAND MD   10 mL at 10/03/20 0600    sodium chloride (NS) flush 5-40 mL  5-40 mL IntraVENous PRN Narda Epstein MD        acetaminophen (TYLENOL) tablet 650 mg  650 mg Oral Q6H PRN Narda Epstein MD        Or    acetaminophen (TYLENOL) suppository 650 mg  650 mg Rectal Q6H PRN Narda Epstein MD        polyethylene glycol (MIRALAX) packet 17 g  17 g Oral DAILY PRN Narda Epstein MD        promethazine (PHENERGAN) tablet 12.5 mg  12.5 mg Oral Q6H PRN Narda Epstein MD        Or    [facility]ron [facility]) injection 4 mg  4 mg IntraVENous Q6H PRN Narda Epstein MD        enoxaparin (LOVENOX) injection 40 mg  40 mg SubCUTAneous DAILY Narda BLAND MD   40 mg at 10/03/20 0856    0.9% sodium chloride infusion  100 mL/hr IntraVENous CONTINUOUS Julio César Mittal MD   Stopped at 10/03/20 0601    nicotine (NICODERM CQ) 21 mg/24 hr patch 1 Patch  1 Patch TransDERmal DAILY Julio César Mittal MD   1 Patch at 10/03/20 0007    influenza vaccine 2020-21 (6 mos+)(PF) (FLUARIX/FLULAVAL/FLUZONE QUAD) injection 0.5 mL  0.5 mL IntraMUSCular PRIOR TO DISCHARGE Boris Marcos MD               PREVIOUS WORKUP: (reviewed)  IMAGING:    CT Results (recent):  Results from [place] encounter on 10/02/20   CT SPINE CERV WO CONT    Narrative EXAM:  CT CERVICAL SPINE WITHOUT CONTRAST    INDICATION: dizziness/ fell and hit head. COMPARISON: None. CONTRAST:  None. TECHNIQUE: Multislice helical CT of the cervical spine was performed without  intravenous contrast administration. Sagittal and coronal reformats were  generated. CT dose reduction was achieved through use of a standardized  protocol tailored for this examination and automatic exposure control for dose  modulation. FINDINGS:    There is mild reversal of curvature centered at C4-5. There is minimal  anterolisthesis of C7 on T1. There is no fracture or compression deformity. The  odontoid process is intact. The craniocervical junction is within normal limits. The incidentally imaged soft tissues are within normal limits. C2-C3: Mild central disc osteophyte complex with thickening of ligamentum flavum  causing mild spinal stenosis. No significant neural foraminal narrowing. Ricka Distad C3-C4: Mild broad-based disc osteophyte complex causing moderate spinal  stenosis. There is moderate left neural foraminal narrowing. C4-C5: Mild disc space narrowing, worse anteriorly. No significant spinal  stenosis. There is moderate left and mild right neural foraminal narrowing. C5-C6: No significant spinal stenosis. There is mild left neural foraminal  narrowing. Ricka Distad C6-C7: No significant spinal stenosis. Mild bilateral neural foraminal  narrowing. .    C7-T1: No significant spinal stenosis. There is moderate left neural foraminal  narrowing. .      Impression IMPRESSION:  Multilevel spondylosis. No acute abnormality. MRI Results (recent):  No results found for this or any previous visit. IR Results (recent):  No results found for this or any previous visit. VAS/US Results (recent):  No results found for this or any previous visit. LABS (reviewed)  Results for orders placed or performed during the hospital encounter of 10/02/20   SAMPLES BEING HELD   Result Value Ref Range    SAMPLES BEING HELD [email or web address]     COMMENT        Add-on orders for these samples will be processed based on acceptable specimen integrity and analyte stability, which may vary by analyte. TROPONIN I   Result Value Ref Range    Troponin-I, Qt. <0.05 <0.05 ng/mL   URINALYSIS W/MICROSCOPIC   Result Value Ref Range    Color YELLOW/STRAW      Appearance CLEAR CLEAR      Specific gravity 1.006 1.003 - 1.030      pH (UA) 6.0 5.0 - 8.0      Protein Negative NEG mg/dL    Glucose Negative NEG mg/dL    Ketone Negative NEG mg/dL    Bilirubin Negative NEG      Blood Negative NEG      Urobilinogen 0.2 0.2 - 1.0 EU/dL    Nitrites Negative NEG      Leukocyte Esterase Negative NEG      WBC 0-4 0 - 4 /hpf    RBC 0-5 0 - 5 /hpf    Epithelial cells FEW FEW /lpf    Bacteria Negative NEG /hpf    Hyaline cast 0-2 0 - 5 /lpf   METABOLIC PANEL, COMPREHENSIVE   Result Value Ref Range    Sodium 140 136 - 145 mmol/L    Potassium 3.5 3.5 - 5.1 mmol/L    Chloride 107 97 - 108 mmol/L    CO2 28 21 - 32 mmol/L    Anion gap 5 5 - 15 mmol/L    Glucose 73 65 - 100 mg/dL    BUN 7 6 - 20 MG/DL    Creatinine 1.05 0.70 - 1.30 MG/DL    BUN/Creatinine ratio 7 (L) 12 - 20      GFR est AA >60 >60 ml/min/1.73m2    GFR est non-AA >60 >60 ml/min/1.73m2    Calcium 8.8 8.5 - 10.1 MG/DL    Bilirubin, total 0.2 0.2 - 1.0 MG/DL    ALT (SGPT) 18 12 - 78 U/L    AST (SGOT) 13 (L) 15 - 37 U/L    Alk.  phosphatase 68 45 - 117 U/L    Protein, total 7.3 6.4 - 8.2 g/dL Albumin 3.9 3.5 - 5.0 g/dL    Globulin 3.4 2.0 - 4.0 g/dL    A-G Ratio 1.1 1.1 - 2.2     NT-PRO BNP   Result Value Ref Range    NT pro- (H) <125 PG/ML   CBC WITH AUTOMATED DIFF   Result Value Ref Range    WBC 9.8 4.1 - 11.1 K/uL    RBC 4.78 4.10 - 5.70 M/uL    HGB 15.1 12.1 - 17.0 g/dL    HCT 45.1 36.6 - 50.3 %    MCV 94.4 80.0 - 99.0 FL    MCH 31.6 26.0 - 34.0 PG    MCHC 33.5 30.0 - 36.5 g/dL    RDW 13.0 11.5 - 14.5 %    PLATELET 282 785 - 234 K/uL    MPV 8.9 8.9 - 12.9 FL    NRBC 0.0 0  WBC    ABSOLUTE NRBC 0.00 0.00 - 0.01 K/uL    NEUTROPHILS 60 32 - 75 %    LYMPHOCYTES 31 12 - 49 %    MONOCYTES 6 5 - 13 %    EOSINOPHILS 2 0 - 7 %    BASOPHILS 1 0 - 1 %    IMMATURE GRANULOCYTES 0 0.0 - 0.5 %    ABS. NEUTROPHILS 5.9 1.8 - 8.0 K/UL    ABS. LYMPHOCYTES 3.0 0.8 - 3.5 K/UL    ABS. MONOCYTES 0.6 0.0 - 1.0 K/UL    ABS. EOSINOPHILS 0.2 0.0 - 0.4 K/UL    ABS. BASOPHILS 0.1 0.0 - 0.1 K/UL    ABS. IMM. GRANS. 0.0 0.00 - 0.04 K/UL    DF AUTOMATED     D DIMER   Result Value Ref Range    D-dimer 0.83 (H) 0.00 - 0.65 mg/L FEU   METABOLIC PANEL, COMPREHENSIVE   Result Value Ref Range    Sodium 140 136 - 145 mmol/L    Potassium 3.8 3.5 - 5.1 mmol/L    Chloride 108 97 - 108 mmol/L    CO2 28 21 - 32 mmol/L    Anion gap 4 (L) 5 - 15 mmol/L    Glucose 93 65 - 100 mg/dL    BUN 7 6 - 20 MG/DL    Creatinine 0.91 0.70 - 1.30 MG/DL    BUN/Creatinine ratio 8 (L) 12 - 20      GFR est AA >60 >60 ml/min/1.73m2    GFR est non-AA >60 >60 ml/min/1.73m2    Calcium 8.6 8.5 - 10.1 MG/DL    Bilirubin, total 0.4 0.2 - 1.0 MG/DL    ALT (SGPT) 18 12 - 78 U/L    AST (SGOT) 14 (L) 15 - 37 U/L    Alk.  phosphatase 66 45 - 117 U/L    Protein, total 7.3 6.4 - 8.2 g/dL    Albumin 3.9 3.5 - 5.0 g/dL    Globulin 3.4 2.0 - 4.0 g/dL    A-G Ratio 1.1 1.1 - 2.2     MAGNESIUM   Result Value Ref Range    Magnesium 2.3 1.6 - 2.4 mg/dL   CBC W/O DIFF   Result Value Ref Range    WBC 9.7 4.1 - 11.1 K/uL    RBC 4.65 4.10 - 5.70 M/uL    HGB 14.6 12.1 - 17.0 g/dL    HCT 43.4 36.6 - 50.3 %    MCV 93.3 80.0 - 99.0 FL    MCH 31.4 26.0 - 34.0 PG    MCHC 33.6 30.0 - 36.5 g/dL    RDW 12.9 11.5 - 14.5 %    PLATELET 623 989 - 040 K/uL    MPV 8.8 (L) 8.9 - 12.9 FL    NRBC 0.0 0  WBC    ABSOLUTE NRBC 0.00 0.00 - 0.01 K/uL   EKG, 12 LEAD, INITIAL   Result Value Ref Range    Ventricular Rate 86 BPM    Atrial Rate 86 BPM    P-R Interval 126 ms    QRS Duration 72 ms    Q-T Interval 358 ms    QTC Calculation (Bezet) 428 ms    Calculated P Axis 78 degrees    Calculated R Axis 71 degrees    Calculated T Axis 68 degrees    Diagnosis       Normal sinus rhythm  Normal ECG  No previous ECGs available         Physical Exam:     Visit Vitals  BP (!) 140/79 (BP 1 Location: Right arm, BP Patient Position: Sitting)   Pulse 100   Temp 98.8 °F (37.1 °C)   Resp 16   Ht 5' 6\" (1.676 m)   Wt 63.5 kg (140 lb)   SpO2 97%   BMI 22.60 kg/m²     General:  Alert, cooperative, no distress. Head:  Normocephalic, without obvious abnormality, atraumatic. Eyes:  Conjunctivae/corneas clear. Lungs:  Heart:   Non labored breathing  Regular rate and rhythm, no carotid bruits   Abdomen:   Soft, non-distended   Extremities: Extremities normal, atraumatic, no cyanosis or edema. Pulses: 2+ and symmetric all extremities. Skin: Skin color, texture, turgor normal. No rashes or lesions.   Neurologic Exam     Gen: Attention normal             Language: naming, repetition, fluency normal             Memory: intact recent and remote memory  Cranial Nerves:  I: smell Not tested   II: visual fields Full to confrontation   II: pupils Equal, round, reactive to light   II: optic disc No papilledema   III,VII: ptosis none   III,IV,VI: extraocular muscles  Full ROM   V: mastication normal   V: facial light touch sensation  normal   VII: facial muscle function   symmetric   VIII: hearing symmetric   IX: soft palate elevation  normal   XI: trapezius strength  5/5   XI: sternocleidomastoid strength 5/5   XI: neck flexion strength  5/5   XII: tongue  midline     Motor: normal bulk and tone, no tremor              Strength: 5/5 all four extremities  Sensory: intact to LT, PP, vibration, and temperature  Reflexes: + throughout; Down going toes  Coordination: Good FTN and HTS  Gait: deferred           Impression:     Sushila Willard is a 68 y.o. male smoker who  has no past medical history on file. who apparently passed out and was found on the floor. History is limited. Consideration includes syncope. Other possibility includes a seizure event (prolonged confusion for 5 hrs - although patient may have baseline undiagnosed dementia)    RECOMMENDATIONS  1. I had a long discussion with patient and family. Discussed diagnosis, prognosis, pathophysiology and available treatment. Reviewed test results. All questions were answered. 2. Offered doing an EEG today, but patient declined because he wants to go home. This can be done as out patient  3. May also need formal evaluation for dementia  4. Cardiology Dr Ricardo Loco also involved and ordered cardiac work up    Follow up in our neurology clinic.     Please call for questions        Thank you for the consultation      Robinson Tellez MD  Diplomate, American Board of Psychiatry and Neurology  Diplomate, Neuromuscular Medicine  Diplomate, American Board of Electrodiagnostic Medicine    Greater than 50% of time spent counseling patient        CC: Honorio Aiken MD  Fax: [phone]

## 2020-10-03 NOTE — ED PROVIDER NOTES
Please note that this dictation was completed with [employer], the computer voice recognition software.  Quite often unanticipated grammatical, syntax, homophones, and other interpretive errors are inadvertently transcribed by the computer software.  Please disregard these errors.  Please excuse any errors that have escaped final proofreading. 75-year-old male past medical history markable for being hard of hearing presents the ER by EMS status post \"got dizzy and fell per the family. \"  Patient states currently he has no complaints thinks today is Saturday unknown month knows that the year is 2020 and the president is President Lily (patient is ANO times 2 out of 4). Patient currently has no complaints though he is hard of hearing. In discussion with patient's family, daughter states she was not present during the episode but that the patient was in the kitchen with his wife (who has dementia) and all she kept saying was help he has fallen and he cannot get up. \"  Family states they thought she was talking about her dogs which she has a habit of stating this with her dogs. The son-in-law then went in and found the patient lying in the floor and he had no memory of how he got into the floor or falling. Patient states she was called at work came home approximately 5 minutes later and she said while the patient was able to get up and ambulate he was looking around the room \"as if he was searching for something. \"  She denies any overt facial droop any slurred speech he was not complaining of any numbness or tingling he did say that his eye was hurting and the side of his head was hurting. \"  She states she was going to load him into the car and drive him here but she was worried he would stroke out on the way and she would never forgive herself. This prompted them to call 911 to transport him to the ER for further evaluation. She does state that currently he does appear back to his baseline.     She denies prior falls/ other recent trauma, recent illness;     Pt/ daughter denies HA, vison changes, diff swallowing, CP, SOB, Abd pain, F/Ch, N/V, D/Cons or other current systemic complaints; No past medical history on file. No past surgical history on file. No family history on file. Social History     Socioeconomic History    Marital status:      Spouse name: Not on file    Number of children: Not on file    Years of education: Not on file    Highest education level: Not on file   Occupational History    Not on file   Social Needs    Financial resource strain: Not on file    Food insecurity     Worry: Not on file     Inability: Not on file    Transportation needs     Medical: Not on file     Non-medical: Not on file   Tobacco Use    Smoking status: Not on file   Substance and Sexual Activity    Alcohol use: Not on file    Drug use: Not on file    Sexual activity: Not on file   Lifestyle    Physical activity     Days per week: Not on file     Minutes per session: Not on file    Stress: Not on file   Relationships    Social connections     Talks on phone: Not on file     Gets together: Not on file     Attends [employer] service: Not on file     Active member of club or organization: Not on file     Attends meetings of clubs or organizations: Not on file     Relationship status: Not on file    Intimate partner violence     Fear of current or ex partner: Not on file     Emotionally abused: Not on file     Physically abused: Not on file     Forced sexual activity: Not on file   Other Topics Concern    Not on file   Social History Narrative    Not on file         ALLERGIES: Pcn [penicillins]    Review of Systems   Constitutional: Negative for chills. HENT: Negative for drooling, trouble swallowing and voice change. Eyes: Negative for photophobia and visual disturbance. Respiratory: Negative for cough, chest tightness and shortness of breath.     Cardiovascular: Negative for chest pain, palpitations and leg swelling. Gastrointestinal: Negative for abdominal pain, diarrhea, nausea and vomiting. Genitourinary: Negative for dysuria, penile swelling, scrotal swelling and testicular pain. Musculoskeletal: Negative for back pain and neck pain. Skin: Negative for rash. Neurological: Positive for syncope. All other systems reviewed and are negative. Vitals:    10/03/20 0004 10/03/20 0007 10/03/20 0708 10/03/20 1157   BP: 136/74 124/77 (!) 140/79 (!) 140/79   Pulse: 82 90 100    Resp: 18 18 16    Temp:   98.8 °F (37.1 °C)    SpO2: 93% 96% 97%    Weight:    63.5 kg (140 lb)   Height:    5' 6\" (1.676 m)            Physical Exam  Vitals signs and nursing note reviewed. Constitutional:       General: He is not in acute distress. Appearance: Normal appearance. He is well-developed. He is not ill-appearing, toxic-appearing or diaphoretic. Comments: NAD, AxOx2 (name/ year/ president), speaking in complete sentences    gcs = 14 (memory)    Pt unsure what happened; He is very hard of hearing   HENT:      Head: Normocephalic and atraumatic. Comments: Cn intact    No facial droop/ slurred speech/ tongue deviation     Right Ear: External ear normal.      Left Ear: External ear normal.      Nose: Nose normal.      Mouth/Throat:      Pharynx: No oropharyngeal exudate or posterior oropharyngeal erythema. Eyes:      General: No scleral icterus. Right eye: No discharge. Left eye: No discharge. Extraocular Movements: Extraocular movements intact. Conjunctiva/sclera: Conjunctivae normal.      Pupils: Pupils are equal, round, and reactive to light. Neck:      Musculoskeletal: Normal range of motion and neck supple. No muscular tenderness. Cardiovascular:      Rate and Rhythm: Normal rate and regular rhythm. Heart sounds: No murmur. No friction rub. No gallop. Pulmonary:      Effort: Pulmonary effort is normal. No respiratory distress.       Breath sounds: Normal breath sounds. No wheezing or rales. Chest:      Chest wall: No tenderness. Abdominal:      General: Bowel sounds are normal. There is no distension. Palpations: Abdomen is soft. There is no mass. Tenderness: There is no abdominal tenderness. There is no guarding or rebound. Genitourinary:     Comments: Pt denies urinary/ Testicular/ scrotal or penile  complaints  Musculoskeletal: Normal range of motion. General: No swelling, tenderness, deformity or signs of injury. Right lower leg: No edema. Left lower leg: No edema. Comments: Pt had central/ paraspinal C/T/L spines, Upper/Lower ext long bones, Abdomen,  Pelvis, hands /feet and all joints palpated and tolerated well (except as above) ; Pt has motor/ CV / Sensation grossly intact to all extremities; Lymphadenopathy:      Cervical: No cervical adenopathy. Skin:     General: Skin is warm and dry. Capillary Refill: Capillary refill takes less than 2 seconds. Coloration: Skin is not jaundiced or pale. Findings: No bruising, erythema, lesion or rash. Neurological:      General: No focal deficit present. Mental Status: He is alert and oriented to person, place, and time. Cranial Nerves: No cranial nerve deficit. Sensory: No sensory deficit. Motor: No weakness. Coordination: Coordination normal.      Gait: Gait normal.      Deep Tendon Reflexes: Reflexes normal.      Comments: pt has motor/ CV/ Sensation grossly intact to all extremities, R > L in strength;          MDM       Procedures    Chief Complaint   Patient presents with    Fall       8:26 PM  The patients presenting problems have been discussed, and they are in agreement with the care plan formulated and outlined with them. I have encouraged them to ask questions as they arise throughout their visit.     MEDICATIONS GIVEN:  Medications - No data to display    LABS REVIEWED:  Labs Reviewed   SAMPLES BEING HELD TROPONIN I   URINALYSIS W/MICROSCOPIC   METABOLIC PANEL, COMPREHENSIVE   NT-PRO BNP   CBC WITH AUTOMATED DIFF       RADIOLOGY RESULTS:  The following have been ordered and reviewed:  _____________________________________________________________________  _____________________________________________________________________    EKG interpretation:   Rhythm: normal sinus rhythm; and regular . Rate (approx.): 86; Axis: normal; P wave: normal; QRS interval: normal ; ST/T wave: normal; Negative acute significant segmental elevations/ no old study     PROCEDURES:        CONSULTATIONS:       PROGRESS NOTES:      DIAGNOSIS:    1. Syncope and collapse        PLAN:  1- syncope - neg ed evaluation thus far/ will admit/ monitor;       ED COURSE: The patients hospital course has been uncomplicated. 9:25 PM  Discussed results w/ Daughter/ Pt/ they agree w/ evaluation for admission;      Perfect Serve Consult for Admission  9:38 PM    ED Room Number: ER04/04  Patient Name and age:  Moises Cranker 68 y.o.  male  Working Diagnosis:   1.  Syncope and collapse        COVID-19 Suspicion:  no  Sepsis present:  no  Reassessment needed: no  Code Status:  Full Code  Readmission: no  Isolation Requirements:  no  Recommended Level of Care:  telemetry  Department:[facility] ED - [phone]  Other:  Unwitnessed Syncope/ unwitnessed fall; neg 1 set of enzymes/ needs overnight OBS please

## 2020-10-03 NOTE — PROGRESS NOTES
Daily Progress Note: 10/3/2020  Matt Smith MD    Assessment/Plan:    Unwitnessed syncope:  Syncope w/u and Neuro and Cards consulted      Probable hypertension.   - monitor and meds if needed    Chronic hearing loss:  Severe presbycusis with non-compliance with hearing aids. Tobacco abuse:Long history of ongoing tobacco use disorder. Nicotine replacement therapy. Cessation counseling done. Dementia  - at least minimal cognitive        Problem List:  Problem List as of 10/3/2020 Never Reviewed          Codes Class Noted - Resolved    Syncope and collapse ICD-10-CM: R55  ICD-9-CM: 780.2  10/2/2020 - Present              Subjective:     Please note patient's daughter at the bedside contributed to most of the history. 17-year-old gentleman without any significant past medical history except for ongoing tobacco use disorder, was brought to the emergency room from home for an unwitnessed syncopal episode. Per  patient's daughter, patient's son-in-law thought he heard a \"thump\" and thereafter found patient lying on the floor in the evening hours of 10/02/20 . Patient had no recollection of the events leading up to the fall. Patient was at his baseline health without any prior complaints of headaches, dizziness, visual deficits, chest pain, palpitations, sore throat, cough, nausea, vomiting, fevers, chills, abdominal pain, bladder or bowel irregularities prior to being found on the floor. (Dr Saurabh Estrada)    10/3:  No complaints. He would not stay in room per nurses and has been up in [place] and sitting with nurses. He does not know why he is here.         Review of Systems:   A comprehensive review of systems was negative except for that written in the HPI.     Objective:   Physical Exam:     Visit Vitals  /77 (BP 1 Location: Right arm, BP Patient Position: Standing)   Pulse 90   Temp 98.5 °F (36.9 °C)   Resp 18   Ht 5' 6\" (1.676 m)   Wt 63.5 kg (140 lb)   SpO2 96%   BMI 22.60 kg/m²      O2 Device: Room air    Temp (24hrs), Av.4 °F (36.9 °C), Min:98.1 °F (36.7 °C), Max:98.7 °F (37.1 °C)    No intake/output data recorded. 10/01 190 - 10/03 0700  In: 588.3 [I.V.:588.3]  Out: -     General:  Alert, thin, cooperative, no distress, appears stated age. Head:  Normocephalic, without obvious abnormality, atraumatic. Eyes:  Conjunctivae/corneas clear. PERRL, EOMs intact. Throat: Lips, mucosa, and tongue moist..   Neck: Supple, symmetrical, trachea midline, no adenopathy, thyroid: no enlargement/tenderness/nodules, no carotid bruit and no JVD. Lungs:   Clear to auscultation bilaterally. Chest wall:  No tenderness or deformity. Heart:  Regular rate and rhythm, S1, S2 normal, no murmur, click, rub or gallop. Abdomen:   Soft, non-tender. Bowel sounds normal. No masses,  No organomegaly. Extremities: no cyanosis or edema. No calf tenderness or cords. Pulses: 2+ and symmetric all extremities. Skin: Skin color, texture, turgor normal. No rashes   Neurologic: Severe hearing loss. Alert and oriented X 2. Paucity of content of speech. Vague answers to questions.  equal.  No cogwheeling or rigidity. Gait observed as he walked in [place] was acceptable. Sensation grossly normal to touch. Gross motor of extremities normal.       Data Review:     EXAM: CT HEAD WO CONT 10/2/20  INDICATION: dizziness/ fell and hit head  COMPARISON: None. CONTRAST: None. FINDINGS:  The ventricles and sulci are normal in size, shape and configuration. . There is  mild white matter disease likely related to chronic small vessel ischemic  disease. There is no intracranial hemorrhage, extra-axial collection, or mass  effect. The basilar cisterns are open. No CT evidence of acute infarct. The bone windows demonstrate no abnormalities. The visualized portions of the  paranasal sinuses and mastoid air cells are clear. IMPRESSION:   No evidence of acute process.     CT Cervical Spine 10/2/20  FINDINGS:  There is mild reversal of curvature centered at C4-5. There is minimal  anterolisthesis of C7 on T1. There is no fracture or compression deformity. The  odontoid process is intact. The craniocervical junction is within normal limits. The incidentally imaged soft tissues are within normal limits. C2-C3: Mild central disc osteophyte complex with thickening of ligamentum flavum  causing mild spinal stenosis. No significant neural foraminal narrowing. Ronold Kanner C3-C4: Mild broad-based disc osteophyte complex causing moderate spinal  stenosis. There is moderate left neural foraminal narrowing. C4-C5: Mild disc space narrowing, worse anteriorly. No significant spinal  stenosis. There is moderate left and mild right neural foraminal narrowing. C5-C6: No significant spinal stenosis. There is mild left neural foraminal  narrowing. Ronold Kanner C6-C7: No significant spinal stenosis. Mild bilateral neural foraminal  narrowing. .  C7-T1: No significant spinal stenosis. There is moderate left neural foraminal  narrowing. Ronold Kanner IMPRESSION:  Multilevel spondylosis. No acute abnormality. Recent Days:  Recent Labs     10/03/20  0012 10/02/20  2029   WBC 9.7 9.8   HGB 14.6 15.1   HCT 43.4 45.1    259     Recent Labs     10/03/20  0012 10/02/20  2029    140   K 3.8 3.5    107   CO2 28 28   GLU 93 73   BUN 7 7   CREA 0.91 1.05   CA 8.6 8.8   MG 2.3  --    ALB 3.9 3.9   TBILI 0.4 0.2   ALT 18 18     No results for input(s): PH, PCO2, PO2, HCO3, FIO2 in the last 72 hours. 24 Hour Results:  Recent Results (from the past 24 hour(s))   SAMPLES BEING HELD    Collection Time: 10/02/20  8:29 PM   Result Value Ref Range    SAMPLES BEING HELD [email or web address]     COMMENT        Add-on orders for these samples will be processed based on acceptable specimen integrity and analyte stability, which may vary by analyte.    TROPONIN I    Collection Time: 10/02/20  8:29 PM   Result Value Ref Range    Troponin-I, Qt. <0.05 <0.05 ng/mL METABOLIC PANEL, COMPREHENSIVE    Collection Time: 10/02/20  8:29 PM   Result Value Ref Range    Sodium 140 136 - 145 mmol/L    Potassium 3.5 3.5 - 5.1 mmol/L    Chloride 107 97 - 108 mmol/L    CO2 28 21 - 32 mmol/L    Anion gap 5 5 - 15 mmol/L    Glucose 73 65 - 100 mg/dL    BUN 7 6 - 20 MG/DL    Creatinine 1.05 0.70 - 1.30 MG/DL    BUN/Creatinine ratio 7 (L) 12 - 20      GFR est AA >60 >60 ml/min/1.73m2    GFR est non-AA >60 >60 ml/min/1.73m2    Calcium 8.8 8.5 - 10.1 MG/DL    Bilirubin, total 0.2 0.2 - 1.0 MG/DL    ALT (SGPT) 18 12 - 78 U/L    AST (SGOT) 13 (L) 15 - 37 U/L    Alk. phosphatase 68 45 - 117 U/L    Protein, total 7.3 6.4 - 8.2 g/dL    Albumin 3.9 3.5 - 5.0 g/dL    Globulin 3.4 2.0 - 4.0 g/dL    A-G Ratio 1.1 1.1 - 2.2     NT-PRO BNP    Collection Time: 10/02/20  8:29 PM   Result Value Ref Range    NT pro- (H) <125 PG/ML   CBC WITH AUTOMATED DIFF    Collection Time: 10/02/20  8:29 PM   Result Value Ref Range    WBC 9.8 4.1 - 11.1 K/uL    RBC 4.78 4.10 - 5.70 M/uL    HGB 15.1 12.1 - 17.0 g/dL    HCT 45.1 36.6 - 50.3 %    MCV 94.4 80.0 - 99.0 FL    MCH 31.6 26.0 - 34.0 PG    MCHC 33.5 30.0 - 36.5 g/dL    RDW 13.0 11.5 - 14.5 %    PLATELET 710 760 - 295 K/uL    MPV 8.9 8.9 - 12.9 FL    NRBC 0.0 0  WBC    ABSOLUTE NRBC 0.00 0.00 - 0.01 K/uL    NEUTROPHILS 60 32 - 75 %    LYMPHOCYTES 31 12 - 49 %    MONOCYTES 6 5 - 13 %    EOSINOPHILS 2 0 - 7 %    BASOPHILS 1 0 - 1 %    IMMATURE GRANULOCYTES 0 0.0 - 0.5 %    ABS. NEUTROPHILS 5.9 1.8 - 8.0 K/UL    ABS. LYMPHOCYTES 3.0 0.8 - 3.5 K/UL    ABS. MONOCYTES 0.6 0.0 - 1.0 K/UL    ABS. EOSINOPHILS 0.2 0.0 - 0.4 K/UL    ABS. BASOPHILS 0.1 0.0 - 0.1 K/UL    ABS. IMM.  GRANS. 0.0 0.00 - 0.04 K/UL    DF AUTOMATED     EKG, 12 LEAD, INITIAL    Collection Time: 10/02/20  8:31 PM   Result Value Ref Range    Ventricular Rate 86 BPM    Atrial Rate 86 BPM    P-R Interval 126 ms    QRS Duration 72 ms    Q-T Interval 358 ms    QTC Calculation (Bezet) 428 ms Calculated P Axis 78 degrees    Calculated R Axis 71 degrees    Calculated T Axis 68 degrees    Diagnosis       Normal sinus rhythm  Normal ECG  No previous ECGs available     URINALYSIS W/MICROSCOPIC    Collection Time: 10/02/20  9:24 PM   Result Value Ref Range    Color YELLOW/STRAW      Appearance CLEAR CLEAR      Specific gravity 1.006 1.003 - 1.030      pH (UA) 6.0 5.0 - 8.0      Protein Negative NEG mg/dL    Glucose Negative NEG mg/dL    Ketone Negative NEG mg/dL    Bilirubin Negative NEG      Blood Negative NEG      Urobilinogen 0.2 0.2 - 1.0 EU/dL    Nitrites Negative NEG      Leukocyte Esterase Negative NEG      WBC 0-4 0 - 4 /hpf    RBC 0-5 0 - 5 /hpf    Epithelial cells FEW FEW /lpf    Bacteria Negative NEG /hpf    Hyaline cast 0-2 0 - 5 /lpf   D DIMER    Collection Time: 10/03/20 12:12 AM   Result Value Ref Range    D-dimer 0.83 (H) 0.00 - 0.65 mg/L FEU   METABOLIC PANEL, COMPREHENSIVE    Collection Time: 10/03/20 12:12 AM   Result Value Ref Range    Sodium 140 136 - 145 mmol/L    Potassium 3.8 3.5 - 5.1 mmol/L    Chloride 108 97 - 108 mmol/L    CO2 28 21 - 32 mmol/L    Anion gap 4 (L) 5 - 15 mmol/L    Glucose 93 65 - 100 mg/dL    BUN 7 6 - 20 MG/DL    Creatinine 0.91 0.70 - 1.30 MG/DL    BUN/Creatinine ratio 8 (L) 12 - 20      GFR est AA >60 >60 ml/min/1.73m2    GFR est non-AA >60 >60 ml/min/1.73m2    Calcium 8.6 8.5 - 10.1 MG/DL    Bilirubin, total 0.4 0.2 - 1.0 MG/DL    ALT (SGPT) 18 12 - 78 U/L    AST (SGOT) 14 (L) 15 - 37 U/L    Alk.  phosphatase 66 45 - 117 U/L    Protein, total 7.3 6.4 - 8.2 g/dL    Albumin 3.9 3.5 - 5.0 g/dL    Globulin 3.4 2.0 - 4.0 g/dL    A-G Ratio 1.1 1.1 - 2.2     MAGNESIUM    Collection Time: 10/03/20 12:12 AM   Result Value Ref Range    Magnesium 2.3 1.6 - 2.4 mg/dL   CBC W/O DIFF    Collection Time: 10/03/20 12:12 AM   Result Value Ref Range    WBC 9.7 4.1 - 11.1 K/uL    RBC 4.65 4.10 - 5.70 M/uL    HGB 14.6 12.1 - 17.0 g/dL    HCT 43.4 36.6 - 50.3 %    MCV 93.3 80.0 - 99.0 FL    MCH 31.4 26.0 - 34.0 PG    MCHC 33.6 30.0 - 36.5 g/dL    RDW 12.9 11.5 - 14.5 %    PLATELET 937 687 - 380 K/uL    MPV 8.8 (L) 8.9 - 12.9 FL    NRBC 0.0 0  WBC    ABSOLUTE NRBC 0.00 0.00 - 0.01 K/uL       Medications reviewed  Current Facility-Administered Medications   Medication Dose Route Frequency    sodium chloride (NS) flush 5-40 mL  5-40 mL IntraVENous Q8H    sodium chloride (NS) flush 5-40 mL  5-40 mL IntraVENous PRN    acetaminophen (TYLENOL) tablet 650 mg  650 mg Oral Q6H PRN    Or    acetaminophen (TYLENOL) suppository 650 mg  650 mg Rectal Q6H PRN    polyethylene glycol (MIRALAX) packet 17 g  17 g Oral DAILY PRN    promethazine (PHENERGAN) tablet 12.5 mg  12.5 mg Oral Q6H PRN    Or    ondansetron (ZOFRAN) injection 4 mg  4 mg IntraVENous Q6H PRN    enoxaparin (LOVENOX) injection 40 mg  40 mg SubCUTAneous DAILY    0.9% sodium chloride infusion  100 mL/hr IntraVENous CONTINUOUS    nicotine (NICODERM CQ) 21 mg/24 hr patch 1 Patch  1 Patch TransDERmal DAILY    influenza vaccine 2020-21 (6 mos+)(PF) (FLUARIX/FLULAVAL/FLUZONE QUAD) injection 0.5 mL  0.5 mL IntraMUSCular PRIOR TO DISCHARGE       Care Plan discussed with: Patient/Family and Nurse    Total time spent with patient: 30 minutes.     Sandra Justin MD

## 2020-10-04 LAB
ATRIAL RATE: 86 BPM
CALCULATED P AXIS, ECG09: 78 DEGREES
CALCULATED R AXIS, ECG10: 71 DEGREES
CALCULATED T AXIS, ECG11: 68 DEGREES
DIAGNOSIS, 93000: NORMAL
P-R INTERVAL, ECG05: 126 MS
Q-T INTERVAL, ECG07: 358 MS
QRS DURATION, ECG06: 72 MS
QTC CALCULATION (BEZET), ECG08: 428 MS
VENTRICULAR RATE, ECG03: 86 BPM

## 2022-03-19 PROBLEM — R55 SYNCOPE AND COLLAPSE: Status: ACTIVE | Noted: 2020-10-02

## 2023-06-23 ENCOUNTER — HOSPITAL ENCOUNTER (EMERGENCY)
Facility: HOSPITAL | Age: 73
Discharge: ANOTHER HOSPITAL | End: 2023-06-23
Attending: STUDENT IN AN ORGANIZED HEALTH CARE EDUCATION/TRAINING PROGRAM
Payer: MEDICARE

## 2023-06-23 ENCOUNTER — HOSPITAL ENCOUNTER (EMERGENCY)
Facility: HOSPITAL | Age: 73
Discharge: HOME | End: 2023-06-23
Attending: STUDENT IN AN ORGANIZED HEALTH CARE EDUCATION/TRAINING PROGRAM
Payer: MEDICARE

## 2023-06-23 ENCOUNTER — APPOINTMENT (OUTPATIENT)
Facility: HOSPITAL | Age: 73
End: 2023-06-23
Payer: MEDICARE

## 2023-06-23 VITALS
WEIGHT: 119.27 LBS | TEMPERATURE: 98.1 F | HEART RATE: 84 BPM | BODY MASS INDEX: 18.72 KG/M2 | DIASTOLIC BLOOD PRESSURE: 65 MMHG | RESPIRATION RATE: 16 BRPM | OXYGEN SATURATION: 96 % | SYSTOLIC BLOOD PRESSURE: 159 MMHG | HEIGHT: 67 IN

## 2023-06-23 VITALS
OXYGEN SATURATION: 97 % | SYSTOLIC BLOOD PRESSURE: 169 MMHG | HEART RATE: 83 BPM | RESPIRATION RATE: 16 BRPM | DIASTOLIC BLOOD PRESSURE: 80 MMHG | TEMPERATURE: 97.8 F

## 2023-06-23 DIAGNOSIS — R31.0 GROSS HEMATURIA: Primary | ICD-10-CM

## 2023-06-23 DIAGNOSIS — R33.9 URINARY RETENTION: ICD-10-CM

## 2023-06-23 LAB
ANION GAP SERPL CALC-SCNC: 7 MMOL/L (ref 5–15)
APPEARANCE UR: ABNORMAL
BACTERIA URNS QL MICRO: NEGATIVE /HPF
BASOPHILS # BLD: 0.1 K/UL (ref 0–0.1)
BASOPHILS NFR BLD: 1 % (ref 0–1)
BILIRUB UR QL CFM: NEGATIVE
BUN SERPL-MCNC: 17 MG/DL (ref 6–20)
BUN/CREAT SERPL: 17 (ref 12–20)
CALCIUM SERPL-MCNC: 9.2 MG/DL (ref 8.5–10.1)
CHLORIDE SERPL-SCNC: 106 MMOL/L (ref 97–108)
CO2 SERPL-SCNC: 30 MMOL/L (ref 21–32)
COLOR UR: ABNORMAL
CREAT SERPL-MCNC: 1.02 MG/DL (ref 0.7–1.3)
DIFFERENTIAL METHOD BLD: NORMAL
EOSINOPHIL # BLD: 0.1 K/UL (ref 0–0.4)
EOSINOPHIL NFR BLD: 1 % (ref 0–7)
EPITH CASTS URNS QL MICRO: ABNORMAL /LPF
ERYTHROCYTE [DISTWIDTH] IN BLOOD BY AUTOMATED COUNT: 13.2 % (ref 11.5–14.5)
GLUCOSE SERPL-MCNC: 103 MG/DL (ref 65–100)
GLUCOSE UR STRIP.AUTO-MCNC: 100 MG/DL
HCT VFR BLD AUTO: 43.7 % (ref 36.6–50.3)
HGB BLD-MCNC: 14.2 G/DL (ref 12.1–17)
HGB UR QL STRIP: ABNORMAL
IMM GRANULOCYTES # BLD AUTO: 0 K/UL (ref 0–0.04)
IMM GRANULOCYTES NFR BLD AUTO: 0 % (ref 0–0.5)
KETONES UR QL STRIP.AUTO: 15 MG/DL
LEUKOCYTE ESTERASE UR QL STRIP.AUTO: ABNORMAL
LYMPHOCYTES # BLD: 1.9 K/UL (ref 0.8–3.5)
LYMPHOCYTES NFR BLD: 29 % (ref 12–49)
MCH RBC QN AUTO: 30.9 PG (ref 26–34)
MCHC RBC AUTO-ENTMCNC: 32.5 G/DL (ref 30–36.5)
MCV RBC AUTO: 95.2 FL (ref 80–99)
MONOCYTES # BLD: 0.6 K/UL (ref 0–1)
MONOCYTES NFR BLD: 9 % (ref 5–13)
NEUTS SEG # BLD: 4 K/UL (ref 1.8–8)
NEUTS SEG NFR BLD: 61 % (ref 32–75)
NITRITE UR QL STRIP.AUTO: POSITIVE
NRBC # BLD: 0 K/UL (ref 0–0.01)
NRBC BLD-RTO: 0 PER 100 WBC
PH UR STRIP: 7 (ref 5–8)
PLATELET # BLD AUTO: 207 K/UL (ref 150–400)
PMV BLD AUTO: 9.2 FL (ref 8.9–12.9)
POTASSIUM SERPL-SCNC: 4.5 MMOL/L (ref 3.5–5.1)
PROT UR STRIP-MCNC: >300 MG/DL
RBC # BLD AUTO: 4.59 M/UL (ref 4.1–5.7)
RBC #/AREA URNS HPF: >100 /HPF (ref 0–5)
SODIUM SERPL-SCNC: 143 MMOL/L (ref 136–145)
SP GR UR REFRACTOMETRY: 1.01 (ref 1–1.03)
UROBILINOGEN UR QL STRIP.AUTO: >8 EU/DL (ref 0.2–1)
WBC # BLD AUTO: 6.5 K/UL (ref 4.1–11.1)
WBC URNS QL MICRO: ABNORMAL /HPF (ref 0–4)

## 2023-06-23 PROCEDURE — 96374 THER/PROPH/DIAG INJ IV PUSH: CPT

## 2023-06-23 PROCEDURE — 99285 EMERGENCY DEPT VISIT HI MDM: CPT

## 2023-06-23 PROCEDURE — 2580000003 HC RX 258: Performed by: STUDENT IN AN ORGANIZED HEALTH CARE EDUCATION/TRAINING PROGRAM

## 2023-06-23 PROCEDURE — 99284 EMERGENCY DEPT VISIT MOD MDM: CPT

## 2023-06-23 PROCEDURE — 6360000002 HC RX W HCPCS: Performed by: STUDENT IN AN ORGANIZED HEALTH CARE EDUCATION/TRAINING PROGRAM

## 2023-06-23 PROCEDURE — 76770 US EXAM ABDO BACK WALL COMP: CPT

## 2023-06-23 PROCEDURE — 36415 COLL VENOUS BLD VENIPUNCTURE: CPT

## 2023-06-23 PROCEDURE — 96361 HYDRATE IV INFUSION ADD-ON: CPT

## 2023-06-23 PROCEDURE — 81001 URINALYSIS AUTO W/SCOPE: CPT

## 2023-06-23 PROCEDURE — 6370000000 HC RX 637 (ALT 250 FOR IP)

## 2023-06-23 PROCEDURE — 6370000000 HC RX 637 (ALT 250 FOR IP): Performed by: STUDENT IN AN ORGANIZED HEALTH CARE EDUCATION/TRAINING PROGRAM

## 2023-06-23 PROCEDURE — 80048 BASIC METABOLIC PNL TOTAL CA: CPT

## 2023-06-23 PROCEDURE — 85025 COMPLETE CBC W/AUTO DIFF WBC: CPT

## 2023-06-23 PROCEDURE — 87086 URINE CULTURE/COLONY COUNT: CPT

## 2023-06-23 RX ORDER — LORAZEPAM 2 MG/ML
0.5 INJECTION INTRAMUSCULAR ONCE
Status: COMPLETED | OUTPATIENT
Start: 2023-06-23 | End: 2023-06-23

## 2023-06-23 RX ORDER — LIDOCAINE HYDROCHLORIDE 20 MG/ML
JELLY TOPICAL
Status: COMPLETED
Start: 2023-06-23 | End: 2023-06-23

## 2023-06-23 RX ORDER — 0.9 % SODIUM CHLORIDE 0.9 %
1000 INTRAVENOUS SOLUTION INTRAVENOUS ONCE
Status: COMPLETED | OUTPATIENT
Start: 2023-06-23 | End: 2023-06-23

## 2023-06-23 RX ORDER — SULFAMETHOXAZOLE AND TRIMETHOPRIM 800; 160 MG/1; MG/1
1 TABLET ORAL 2 TIMES DAILY
Qty: 6 TABLET | Refills: 0 | Status: SHIPPED | OUTPATIENT
Start: 2023-06-23 | End: 2023-06-26

## 2023-06-23 RX ORDER — NICOTINE 21 MG/24HR
1 PATCH, TRANSDERMAL 24 HOURS TRANSDERMAL DAILY
Status: DISCONTINUED | OUTPATIENT
Start: 2023-06-23 | End: 2023-06-23 | Stop reason: HOSPADM

## 2023-06-23 RX ORDER — LIDOCAINE HYDROCHLORIDE 20 MG/ML
JELLY TOPICAL PRN
Status: DISCONTINUED | OUTPATIENT
Start: 2023-06-23 | End: 2023-06-23 | Stop reason: HOSPADM

## 2023-06-23 RX ORDER — LORAZEPAM 0.5 MG/1
1 TABLET ORAL ONCE
Status: DISCONTINUED | OUTPATIENT
Start: 2023-06-23 | End: 2023-06-23

## 2023-06-23 RX ADMIN — LIDOCAINE HYDROCHLORIDE: 20 JELLY TOPICAL at 18:34

## 2023-06-23 RX ADMIN — LORAZEPAM 0.5 MG: 2 INJECTION INTRAMUSCULAR; INTRAVENOUS at 18:34

## 2023-06-23 RX ADMIN — LIDOCAINE HYDROCHLORIDE: 20 JELLY TOPICAL at 11:49

## 2023-06-23 RX ADMIN — SODIUM CHLORIDE 1000 ML: 9 INJECTION, SOLUTION INTRAVENOUS at 10:04

## 2023-06-23 RX ADMIN — CEFTRIAXONE 1000 MG: 1 INJECTION, POWDER, FOR SOLUTION INTRAMUSCULAR; INTRAVENOUS at 12:34

## 2023-06-23 ASSESSMENT — PAIN SCALES - GENERAL
PAINLEVEL_OUTOF10: 0
PAINLEVEL_OUTOF10: 0

## 2023-06-23 ASSESSMENT — LIFESTYLE VARIABLES
HOW MANY STANDARD DRINKS CONTAINING ALCOHOL DO YOU HAVE ON A TYPICAL DAY: PATIENT DOES NOT DRINK
HOW OFTEN DO YOU HAVE A DRINK CONTAINING ALCOHOL: NEVER

## 2023-06-23 ASSESSMENT — ENCOUNTER SYMPTOMS
ABDOMINAL PAIN: 0
EYE PAIN: 0
DIARRHEA: 0
COUGH: 0
SHORTNESS OF BREATH: 0
NAUSEA: 0
EYE REDNESS: 0

## 2023-06-23 ASSESSMENT — PAIN - FUNCTIONAL ASSESSMENT
PAIN_FUNCTIONAL_ASSESSMENT: NONE - DENIES PAIN
PAIN_FUNCTIONAL_ASSESSMENT: 0-10

## 2023-06-23 NOTE — ED NOTES
Pt urinated in waiting room and sample only contained small amount of blood and clots     Stefan Boast, RN  06/23/23 [id number]

## 2023-06-23 NOTE — ED NOTES
Pt transported to [address] [place] [address] by [facility], mar and POC.       Skyler Koroma RN  06/23/23 [id number]

## 2023-06-23 NOTE — ED NOTES
2 attempts to place macias cath, unsuccessful; Dr. Mary Briceno at bedside, explained that I was hitting a stricture.  Pt very uncomfortable; Bladder scan completed 182 ml      Rhiannon Holliday RN  06/23/23 [id number]

## 2023-06-23 NOTE — ED NOTES
Report called to Jayesh Ramirez RN at [facility]; Reviewed assessment, POC, and MAR given a chance to ask questions and clarify.  PT to be transported to [facility]     Nalini Campos RN  06/23/23 1844

## 2023-06-23 NOTE — ED TRIAGE NOTES
Pt ambulatory to room 14. C/o hematuria x 1-2 days; denied N/V, fever, flank pain, abdominal pain ; pt denied any pain.  Non tender on palpation

## 2023-06-23 NOTE — ED PROVIDER NOTES
[facility] EMERGENCY DEP  EMERGENCY DEPARTMENT ENCOUNTER      Pt Name: Aquiles Bliss  MRN: [id number]  Gerardo 1950  Date of evaluation: 6/23/2023  Provider: Greg Colon MD    CHIEF COMPLAINT       Chief Complaint   Patient presents with    Urinary Retention         HISTORY OF PRESENT ILLNESS   (Location/Symptom, Timing/Onset, Context/Setting, Quality, Duration, Modifying Factors, Severity)  Note limiting factors. Patient is a 26-year-old male present emergency department via transfer from [facility] emergency department for hematuria concern for bladder mass. Patient was excepted as a transfer for urology to see in consultation. On arrival patient is in no acute distress denies any suprapubic or abdominal pain. Is having a difficult time urinating. Review of External Medical Records:     Nursing Notes were reviewed. REVIEW OF SYSTEMS    (2-9 systems for level 4, 10 or more for level 5)     Review of Systems    Except as noted above the remainder of the review of systems was reviewed and negative. PAST MEDICAL HISTORY     Past Medical History:   Diagnosis Date    Arthritis          SURGICAL HISTORY     History reviewed. No pertinent surgical history. CURRENT MEDICATIONS       Previous Medications    No medications on file       ALLERGIES     Penicillins    FAMILY HISTORY     History reviewed. No pertinent family history.        SOCIAL HISTORY       Social History     Socioeconomic History    Marital status:      Spouse name: None    Number of children: None    Years of education: None    Highest education level: None   Tobacco Use    Smoking status: Every Day     Packs/day: 2.00     Types: Cigarettes    Smokeless tobacco: Never   Substance and Sexual Activity    Alcohol use: Not Currently     Comment: 22 years clean    Drug use: Never           PHYSICAL EXAM    (up to 7 for level 4, 8 or more for level 5)     ED Triage Vitals [06/23/23 1651]   BP Temp Temp Source

## 2023-06-23 NOTE — ED TRIAGE NOTES
Pt arrives via hospital to home from [place] due to inability to urinate. Blanca Love attempted for macias 2x without success. Pt states he peed a little in the urinal and dumped it prior to nurse being able to assess.

## 2023-06-23 NOTE — ED PROVIDER NOTES
[facility] EMERGENCY DEPT  EMERGENCY DEPARTMENT ENCOUNTER      Pt Name: Mohinder Alfonso  MRN: [id number]  Albinagfsantos 1950  Date of evaluation: 6/23/2023  Provider: Gurmeet Pierce       Chief Complaint   Patient presents with    Hematuria         HISTORY OF PRESENT ILLNESS   (Location/Symptom, Timing/Onset, Context/Setting, Quality, Duration, Modifying Factors, Severity)  Note limiting factors. Patient is a 77-year-old male who is otherwise healthy presents today with gross hematuria. He does have a history of remote prostate cancer with a prostatectomy several years ago but no issues since. Has had gross hematuria for the past 2 days. Has not had an pain of the back/flank/abdomen. No dysuria. Has passed small clots. Is not on blood thinners. Denies injury or trauma. Has not had issues with this in the past.  Has not had for he was emptying his bladder. Review of External Medical Records:     Nursing Notes were reviewed. REVIEW OF SYSTEMS    (2-9 systems for level 4, 10 or more for level 5)     Review of Systems   Constitutional:  Negative for fatigue and fever. HENT:  Negative for congestion. Eyes:  Negative for pain and redness. Respiratory:  Negative for cough and shortness of breath. Cardiovascular:  Negative for chest pain and leg swelling. Gastrointestinal:  Negative for abdominal pain, diarrhea and nausea. Genitourinary:  Positive for hematuria. Negative for difficulty urinating and dysuria. Musculoskeletal:  Negative for arthralgias and myalgias. Skin:  Negative for rash and wound. Neurological:  Negative for dizziness and headaches. Except as noted above the remainder of the review of systems was reviewed and negative. PAST MEDICAL HISTORY     Past Medical History:   Diagnosis Date    Arthritis          SURGICAL HISTORY     History reviewed. No pertinent surgical history.       CURRENT MEDICATIONS       Previous Medications

## 2023-06-23 NOTE — CONSULTS
Department of Urology  Attending Consult Note      Reason for Consult:  hematuria, retention    CHIEF COMPLAINT:  hematuria, retention    History Obtained From:  patient, daughter    HISTORY OF PRESENT ILLNESS:                The patient is a 74 y.o. male with hx of prostate cancer s/p radical retropubic prostatectomy (2012, [facility]) who presents to ER with gross hematuria and inability to void. He has been passing blood clots for the past few days, and voiding small amounts of dark bloody urine here. He does have some dementia, but denies any abdominal pain. Mild dysuria. Nursing unable to place macias. He is a heavy smoker. No fevers, chills, nausea, or vomiting. Past Medical History:        Diagnosis Date    Arthritis    Prostate cancer    Past Surgical History:    Radical prostatectomy (open) - 2012    Current Medications:   Current Facility-Administered Medications: lidocaine (XYLOCAINE) 2 % uro-jet, , Topical, PRN    Allergies:  Penicillins    Social History:   Smoker, lives with daughter in [facility]    Family History:   History reviewed. No pertinent family history. REVIEW OF SYSTEMS:  Complete ROS obtained and negative except as noted in HPI. PHYSICAL EXAM:    VITALS:  BP (!) 162/63   Pulse 83   Temp 97.8 °F (36.6 °C) (Oral)   Resp 16   SpO2 95%   AAOx3 in NAD  Abdomen soft, non-tender, infrapubic scar  Non-labored breathing  No peripheral edema  Normal mood/affect  Normal phallus, orthotopic meatus, no stenosis      DATA:  Renal bladder ultrasound shows no hydro, bladder with irregularities suspicious for tumors    IMPRESSION/RECOMMENDATION:    72yoM smoker with hx of CaP s/p RRP (2012) now with gross hematuria. Macias placed using sterile technique over a wire. Suspect mild BNC. Drained 150cc of dark bloody urine and clots.  Bladder then irrigated with >1L NS until clear, moderate clot burden removed, no active bleeding.    - Urine for culture  - Empiric abx  - -Continue macias  - Follow up next

## 2023-06-24 LAB
BACTERIA SPEC CULT: NORMAL
SERVICE CMNT-IMP: NORMAL

## 2023-06-24 NOTE — ED NOTES
Discharge instructions given to patient by MD and nurse. Pt has been given counseling on medication use and verbalizes understanding. IV d/c. Pt to follow up with [facility]. Pt wheeled off of unit in no signs of distress.       Viki Hager RN  06/23/23 2007